# Patient Record
Sex: FEMALE | Race: WHITE | NOT HISPANIC OR LATINO | Employment: FULL TIME | URBAN - METROPOLITAN AREA
[De-identification: names, ages, dates, MRNs, and addresses within clinical notes are randomized per-mention and may not be internally consistent; named-entity substitution may affect disease eponyms.]

---

## 2019-12-10 ENCOUNTER — HOSPITAL ENCOUNTER (EMERGENCY)
Facility: HOSPITAL | Age: 24
Discharge: HOME | End: 2019-12-10
Attending: FAMILY MEDICINE
Payer: COMMERCIAL

## 2019-12-10 VITALS
DIASTOLIC BLOOD PRESSURE: 94 MMHG | SYSTOLIC BLOOD PRESSURE: 150 MMHG | BODY MASS INDEX: 48.66 KG/M2 | WEIGHT: 264.44 LBS | OXYGEN SATURATION: 98 % | TEMPERATURE: 98 F | HEART RATE: 103 BPM | RESPIRATION RATE: 20 BRPM | HEIGHT: 62 IN

## 2019-12-10 DIAGNOSIS — T50.905A ADVERSE EFFECT OF DRUG, INITIAL ENCOUNTER: Primary | ICD-10-CM

## 2019-12-10 PROCEDURE — 99282 EMERGENCY DEPT VISIT SF MDM: CPT

## 2019-12-10 NOTE — ED PROVIDER NOTES
"SCRIBE #1 NOTE: I, Christopher Day, am scribing for, and in the presence of, Jacob Dave Jr., KI. I have scribed the entire note.       History     Chief Complaint   Patient presents with    Headache     Pt c/o of "heaviness and tingling" in head since this a.m. Denies weakness in arms or legs or CP. Recently started new med lamitrogine and has had alterations in thyroid medicine. Left [facility].      Review of patient's allergies indicates:  No Known Allergies      History of Present Illness     HPI    12/10/2019, 3:50 PM  History obtained from the patient      History of Present Illness: Estrella Ruiz is a 23 y.o. female patient who presents to the Emergency Department for evaluation of headache which onset gradually Thursday. Pt describes her headache as tingling and burning to the head, neck, and face. Pt states her sxs are worse after she wakes up and lessens. Symptoms are constant and moderate in severity. No mitigating or exacerbating factors reported. No other sxs reported. Patient denies any loss of balance, fever, chills, neck pain/stiffness, visual disturbance/photophobia, extremity weakness/numbness, dizziness, and all other sxs at this time. Pt states she was diagnosed with Lamictal 2 weeks ago and has been progressively increasing her dose as prescribed. Pt states she stopped taking the Lamictal Saturday and called her psychiatrist Monday who agreed to stop taking it. Pt states she also recently started retaking Synthroid. Pt was seen at [facility] early today and had blood work drawn but left prior to results. No further complaints or concerns at this time.         Arrival mode: Personal vehicle      PCP: Lou Abraham MD        Past Medical History:  No past medical history on file.    Past Surgical History:  No past surgical history on file.      Family History:  No family history on file.    Social History:  Social History     Tobacco Use    Smoking status: Never " Smoker   Substance and Sexual Activity    Alcohol use: Yes     Comment: Ocasionally    Drug use: Not on file    Sexual activity: Not on file        Review of Systems     Review of Systems   Constitutional: Negative for chills and fever.   HENT: Negative for sore throat.    Eyes: Negative for photophobia and visual disturbance.   Respiratory: Negative for shortness of breath.    Cardiovascular: Negative for chest pain.   Gastrointestinal: Negative for nausea.   Genitourinary: Negative for dysuria.   Musculoskeletal: Negative for back pain, neck pain and neck stiffness.   Skin: Negative for rash.   Neurological: Positive for headaches. Negative for dizziness, weakness and numbness.   Hematological: Does not bruise/bleed easily.   All other systems reviewed and are negative.       Physical Exam     Initial Vitals [12/10/19 1535]   BP Pulse Resp Temp SpO2   (!) 150/94 103 20 98.3 °F (36.8 °C) 98 %      MAP       --          Physical Exam  Nursing Notes and Vital Signs Reviewed.  Constitutional: Patient is in no apparent distress. Well-developed and well-nourished.  Head: Atraumatic. Normocephalic.  Eyes: PERRL. EOM intact. Conjunctivae are not pale. No scleral icterus.  ENT: Mucous membranes are moist. Oropharynx is clear and symmetric.    Neck: Supple. Full ROM. No lymphadenopathy.  Cardiovascular: Regular rate. Regular rhythm. No murmurs, rubs, or gallops. Distal pulses are 2+ and symmetric.  Pulmonary/Chest: No respiratory distress. Clear to auscultation bilaterally. No wheezing or rales.  Abdominal: Soft and non-distended.  There is no tenderness.  No rebound, guarding, or rigidity. Good bowel sounds.  Genitourinary: No CVA tenderness  Musculoskeletal: Moves all extremities. No obvious deformities. No edema.   Skin: Warm and dry.  Neurological: Patient is alert and oriented to person, place and time. Pupils ERRL and EOM normal. Cranial nerves II-XII are intact. Strength is full bilaterally; it is equal and 5/5 in  "bilateral upper and lower extremities. There is no pronator drift of outstretched arms. Light touch sense is intact. Speech is clear and normal. No acute focal neurological deficits noted.  Psychiatric: Normal affect. Good eye contact. Appropriate in content.     ED Course   Procedures  ED Vital Signs:  Vitals:    12/10/19 1535   BP: (!) 150/94   Pulse: 103   Resp: 20   Temp: 98.3 °F (36.8 °C)   TempSrc: Oral   SpO2: 98%   Weight: 120 kg (264 lb 7.1 oz)   Height: 5' 2" (1.575 m)         All Lab Results:  CBC auto differential (12/10/2019 11:52 AM CST)  CBC auto differential (12/10/2019 11:52 AM CST)   Component Value Ref Range Performed At Pathologist Signature   WHITE BLOOD CELL COUNT 12.5 (H) 4.0 - 11.0 1000/ul [facility]     RED BLOOD CELL COUNT 5.17 3.80 - 5.30 mill/uL [facility]     HEMOGLOBIN 13.9 12.0 - 16.0 gm/dl [facility]     HEMATOCRIT 43.4 37.0 - 47.0 % [facility]     MEAN CORPUSCULAR VOLUME 84 80 - 100 fl [facility]     MEAN CORPUSCULAR HEMOGLOBIN CONC 31.9 31.0 - 37.0 gm/dl [facility]     RED CELL DISTRIBUTION WIDTH 12.6 12.1 - 14.9 % [facility]     PLATELET COUNT 350 150 - 375 1000/ul [facility]     MEAN PLATELET VOLUME 7.6 6.5 - 12.0 fl [facility]     Neutrophils Abs 8.8 1.5 - 10.0 1000/ul [facility]     Lymphocytes Abs 2.5 1.3 - 2.9 1000/ul [facility]     Monocytes Abs 0.9 0.1 - 1.0 1000/ul [facility]     Eosinophils Abs 0.2 0.0 - 0.7 1000/ul [facility]     Basophils Abs 0.1 0.0 - 0.1 1000/ul [facility]     Neutrophils % 71 44 - 81 % [facility]     Lymphocytes % 20 (L) 21 - 47 % [facility]     Monocytes % 7 2 - 11 % [facility]     Eosinophils % 1 0 - 7 % [facility]     Basophils % 1 0 - 1 % [facility]       CBC auto differential " (12/10/2019 11:52 AM CST)   Specimen   Blood - Vein     CBC auto differential (12/10/2019 11:52 AM CST)   Performing Organization Address City[facility]   [facility]   [place] [address] [phone]     Back to top of Lab Results       Magnesium (12/10/2019 11:52 AM CST)  Magnesium (12/10/2019 11:52 AM CST)   Component Value Ref Range Performed At Pathologist Signature   Magnesium Level 2.1 1.6 - 2.6 mg/dL [facility]       Magnesium (12/10/2019 11:52 AM CST)   Specimen   Blood - Vein     Magnesium (12/10/2019 11:52 AM CST)   Performing Organization Address City/[facility] Phone Number   [facility]   [place] [address] [phone]     Back to top of Lab Results       TSH (12/10/2019 11:52 AM CST)  TSH (12/10/2019 11:52 AM CST)   Component Value Ref Range Performed At Pathologist Signature   TSH Ultrasensitive 4.250 0.350 - 4.940 uIU/mL [facility]       TSH (12/10/2019 11:52 AM CST)   Specimen   Blood - Vein     TSH (12/10/2019 11:52 AM CST)   Performing Organization Address City/[facility] Phone Number   [facility]   [place] [address] [phone]     Back to top of Lab Results       Comprehensive metabolic panel (12/10/2019 11:52 AM CST)  Comprehensive metabolic panel (12/10/2019 11:52 AM CST)   Component Value Ref Range Performed At Pathologist Signature   Sodium Level 137 136 - 145 mmol/L [facility]     Potassium Level 4.6 3.5 - 5.1 mmol/L [facility]     Chloride Level 105 100 - 109 mmol/L [facility]     CO2 Level 22 22 - 33 mmol/L [facility]     Glucose Level 96 70 - 100 mg/dL [facility]     Blood Urea Nitrogen Level 12 5 - 25 mg/dL [facility]     Creatinine Level 0.81 0.57 - 1.25 mg/dL [facility]     GFR- >=60 mL/min/1.73mSq [facility]     GFR-RJ 87 >=60 mL/min/1.73mSq [facility]     Calcium Level 9.9 8.8 - 10.6 mg/dL [facility]     Protein Total 8.5 (H) 6.0 - 8.3 g/dL [facility]     Albumin Level 4.1 3.5 - 5.0 g/dL [facility]     Bilirubin Total 0.6 0.2 - 1.2 mg/dL [facility]     Alkaline Phosphatase Level 74 40 - 150 IU/L [facility]     SGOT (AST) 23 10 - 58 IU/L [facility]     SGPT (ALT) 47 5 - 50 IU/L [facility]     Anion Gap 10 8 - 16 mmol/L [facility]                         The Emergency Provider reviewed the vital signs and test results, which are outlined above.     ED Discussion     4:02 PM: Discussed with pt all pertinent ED information and results from [facility]. Discussed pt dx and plan of tx. Gave pt all f/u and return to the ED instructions. All questions and concerns were addressed at this time. Pt expresses understanding of information and instructions, and is comfortable with plan to discharge. Pt is stable for discharge.    I discussed with patient and/or family/caretaker that evaluation in the ED does not suggest any emergent or life threatening medical conditions requiring immediate intervention beyond what was provided in the ED, and I believe patient is safe for discharge.  Regardless, an unremarkable evaluation in the ED does not preclude the development or presence of a serious of life threatening condition. As such, patient was instructed to return immediately for any worsening or change in current symptoms.         Medical Decision Making:   Clinical Tests:   Lab Tests: Reviewed           ED Medication(s):  Medications - No data to display    There are no discharge medications for this patient.      Follow-up Information     Lou Abraham MD In 1 week.    Specialty:  Family Medicine  Contact information:  [address] [facility] [address] [phone]                       Scribdarshana  Attestation:   Scribe #1: I performed the above scribed service and the documentation accurately describes the services I performed. I attest to the accuracy of the note.     Attending:   Physician Attestation Statement for Scribe #1: I, KI Allison Jr., personally performed the services described in this documentation, as scribed by Christopher Boyle, in my presence, and it is both accurate and complete.           Clinical Impression       ICD-10-CM ICD-9-CM   1. Adverse effect of drug, initial encounter T50.905A E947.9       Disposition:   Disposition: Discharged  Condition: Stable         KI Allison Jr.  12/10/19 1935

## 2019-12-13 ENCOUNTER — HOSPITAL ENCOUNTER (EMERGENCY)
Facility: HOSPITAL | Age: 24
Discharge: HOME | End: 2019-12-13
Attending: EMERGENCY MEDICINE
Payer: COMMERCIAL

## 2019-12-13 VITALS
BODY MASS INDEX: 47.87 KG/M2 | OXYGEN SATURATION: 98 % | TEMPERATURE: 98 F | WEIGHT: 260.13 LBS | RESPIRATION RATE: 20 BRPM | HEART RATE: 105 BPM | SYSTOLIC BLOOD PRESSURE: 168 MMHG | HEIGHT: 62 IN | DIASTOLIC BLOOD PRESSURE: 81 MMHG

## 2019-12-13 DIAGNOSIS — R42 LIGHTHEADED: ICD-10-CM

## 2019-12-13 DIAGNOSIS — R42 DIZZINESS: Primary | ICD-10-CM

## 2019-12-13 LAB
ALBUMIN SERPL BCP-MCNC: 4.3 G/DL (ref 3.5–5.2)
ALP SERPL-CCNC: 69 U/L (ref 55–135)
ALT SERPL W/O P-5'-P-CCNC: 54 U/L (ref 10–44)
ANION GAP SERPL CALC-SCNC: 8 MMOL/L (ref 8–16)
AST SERPL-CCNC: 23 U/L (ref 10–40)
B-HCG UR QL: NEGATIVE
BASOPHILS # BLD AUTO: 0.09 K/UL (ref 0–0.2)
BASOPHILS NFR BLD: 0.8 % (ref 0–1.9)
BILIRUB SERPL-MCNC: 0.9 MG/DL (ref 0.1–1)
BUN SERPL-MCNC: 12 MG/DL (ref 6–20)
CALCIUM SERPL-MCNC: 10.1 MG/DL (ref 8.7–10.5)
CHLORIDE SERPL-SCNC: 106 MMOL/L (ref 95–110)
CO2 SERPL-SCNC: 25 MMOL/L (ref 23–29)
CREAT SERPL-MCNC: 1 MG/DL (ref 0.5–1.4)
DIFFERENTIAL METHOD: ABNORMAL
EOSINOPHIL # BLD AUTO: 0.1 K/UL (ref 0–0.5)
EOSINOPHIL NFR BLD: 0.9 % (ref 0–8)
ERYTHROCYTE [DISTWIDTH] IN BLOOD BY AUTOMATED COUNT: 13.5 % (ref 11.5–14.5)
EST. GFR  (AFRICAN AMERICAN): >60 ML/MIN/1.73 M^2
EST. GFR  (NON AFRICAN AMERICAN): >60 ML/MIN/1.73 M^2
GLUCOSE SERPL-MCNC: 97 MG/DL (ref 70–110)
HCT VFR BLD AUTO: 42.4 % (ref 37–48.5)
HGB BLD-MCNC: 13.8 G/DL (ref 12–16)
HIV 1+2 AB+HIV1 P24 AG SERPL QL IA: NEGATIVE
IMM GRANULOCYTES # BLD AUTO: 0.03 K/UL (ref 0–0.04)
IMM GRANULOCYTES NFR BLD AUTO: 0.3 % (ref 0–0.5)
LYMPHOCYTES # BLD AUTO: 2.4 K/UL (ref 1–4.8)
LYMPHOCYTES NFR BLD: 22.2 % (ref 18–48)
MCH RBC QN AUTO: 27.9 PG (ref 27–31)
MCHC RBC AUTO-ENTMCNC: 32.5 G/DL (ref 32–36)
MCV RBC AUTO: 86 FL (ref 82–98)
MONOCYTES # BLD AUTO: 1 K/UL (ref 0.3–1)
MONOCYTES NFR BLD: 8.9 % (ref 4–15)
NEUTROPHILS # BLD AUTO: 7.2 K/UL (ref 1.8–7.7)
NEUTROPHILS NFR BLD: 66.9 % (ref 38–73)
NRBC BLD-RTO: 0 /100 WBC
PLATELET # BLD AUTO: 382 K/UL (ref 150–350)
PMV BLD AUTO: 9.9 FL (ref 9.2–12.9)
POTASSIUM SERPL-SCNC: 4.2 MMOL/L (ref 3.5–5.1)
PROT SERPL-MCNC: 7.9 G/DL (ref 6–8.4)
RBC # BLD AUTO: 4.94 M/UL (ref 4–5.4)
SODIUM SERPL-SCNC: 139 MMOL/L (ref 136–145)
TSH SERPL DL<=0.005 MIU/L-ACNC: 3.64 UIU/ML (ref 0.4–4)
WBC # BLD AUTO: 10.79 K/UL (ref 3.9–12.7)

## 2019-12-13 PROCEDURE — 36415 COLL VENOUS BLD VENIPUNCTURE: CPT

## 2019-12-13 PROCEDURE — 99284 EMERGENCY DEPT VISIT MOD MDM: CPT | Mod: 25

## 2019-12-13 PROCEDURE — 85025 COMPLETE CBC W/AUTO DIFF WBC: CPT

## 2019-12-13 PROCEDURE — 80053 COMPREHEN METABOLIC PANEL: CPT

## 2019-12-13 PROCEDURE — 81025 URINE PREGNANCY TEST: CPT

## 2019-12-13 PROCEDURE — 86703 HIV-1/HIV-2 1 RESULT ANTBDY: CPT

## 2019-12-13 PROCEDURE — 84443 ASSAY THYROID STIM HORMONE: CPT

## 2019-12-13 NOTE — ED NOTES
Patient examined, evaluated, and educated on discharge prescriptions and instructions by NP. Patient discharged to lobby by NP before DC VS could be obtained by this RN. Pt. Witnessed exiting ED with even, steady gait; accompanied by family member; in NAD; resp. E/u.

## 2019-12-13 NOTE — ED PROVIDER NOTES
"Encounter Date: 12/13/2019       History     Chief Complaint   Patient presents with    Hypertension     pt states, "my head is all tingly and my BP is high," x1 week (160s/100s)     23 year old female with complaint of mild headache and feeling light headed X one week.  Pt reports elevated blood pressure over the past week.  No fever or chills. No weakness. Pt also reports difficulty focusing over the past week.  Denies passing out.  Pt has history of hypothyroidism and recently stopped taking her synthroid.          Review of patient's allergies indicates:  No Known Allergies  History reviewed. No pertinent past medical history.  History reviewed. No pertinent surgical history.  History reviewed. No pertinent family history.  Social History     Tobacco Use    Smoking status: Never Smoker   Substance Use Topics    Alcohol use: Yes     Comment: Ocasionally    Drug use: Not on file     Review of Systems   Constitutional: Negative for fever.   HENT: Negative for sore throat.    Respiratory: Negative for shortness of breath.    Cardiovascular: Negative for chest pain.   Gastrointestinal: Negative for nausea.   Genitourinary: Negative for dysuria.   Musculoskeletal: Negative for back pain.   Skin: Negative for rash.   Neurological: Positive for light-headedness. Negative for weakness.   Hematological: Does not bruise/bleed easily.       Physical Exam     Initial Vitals [12/13/19 1301]   BP Pulse Resp Temp SpO2   (!) 168/81 105 20 97.6 °F (36.4 °C) 98 %      MAP       --         Physical Exam    Nursing note and vitals reviewed.  Constitutional: She appears well-developed and well-nourished.   HENT:   Head: Normocephalic and atraumatic.   Eyes: Conjunctivae and EOM are normal. Pupils are equal, round, and reactive to light.   Neck: Normal range of motion. Neck supple.   Cardiovascular: Normal rate, regular rhythm, normal heart sounds and intact distal pulses.   Pulmonary/Chest: Breath sounds normal.   Abdominal: " Soft. There is no tenderness. There is no rebound and no guarding.   Musculoskeletal: Normal range of motion.   Neurological: She is alert and oriented to person, place, and time. She has normal strength and normal reflexes.   Skin: Skin is warm and dry.   Psychiatric: She has a normal mood and affect. Her behavior is normal. Thought content normal.         ED Course   Procedures  Labs Reviewed   CBC W/ AUTO DIFFERENTIAL - Abnormal; Notable for the following components:       Result Value    Platelets 382 (*)     All other components within normal limits   COMPREHENSIVE METABOLIC PANEL - Abnormal; Notable for the following components:    ALT 54 (*)     All other components within normal limits   HIV 1 / 2 ANTIBODY   TSH   PREGNANCY TEST, URINE RAPID          Imaging Results          CT Head Without Contrast (Final result)  Result time 12/13/19 14:55:14    Final result by Dani Decker MD (12/13/19 14:55:14)                 Impression:      No acute findings.      Electronically signed by: Dani Decker MD  Date:    12/13/2019  Time:    14:55             Narrative:    EXAMINATION:  CT HEAD WITHOUT CONTRAST    CLINICAL HISTORY:  Acute headache.  Hypertension.  Lightheadedness.    TECHNIQUE:  Standard noncontrast CT of the brain.    All CT scans at this facility are performed  using dose modulation techniques as appropriate to performed exam including the following:  automated exposure control; adjustment of mA and/or kV according to the patients size (this includes techniques or standardized protocols for targeted exams where dose is matched to indication/reason for exam: i.e. extremities or head);  iterative reconstruction technique.    COMPARISON:  None.    FINDINGS:  The ventricles are nonenlarged.  No acute hemorrhage, edema or mass effect is identified.    The skull is grossly normal.                                                 Labs Reviewed   CBC W/ AUTO DIFFERENTIAL - Abnormal; Notable for the  following components:       Result Value    Platelets 382 (*)     All other components within normal limits   COMPREHENSIVE METABOLIC PANEL - Abnormal; Notable for the following components:    ALT 54 (*)     All other components within normal limits   HIV 1 / 2 ANTIBODY   TSH   PREGNANCY TEST, URINE RAPID           Imaging Results          CT Head Without Contrast (Final result)  Result time 12/13/19 14:55:14    Final result by Dani Decker MD (12/13/19 14:55:14)                 Impression:      No acute findings.      Electronically signed by: Dani Decker MD  Date:    12/13/2019  Time:    14:55             Narrative:    EXAMINATION:  CT HEAD WITHOUT CONTRAST    CLINICAL HISTORY:  Acute headache.  Hypertension.  Lightheadedness.    TECHNIQUE:  Standard noncontrast CT of the brain.    All CT scans at this facility are performed  using dose modulation techniques as appropriate to performed exam including the following:  automated exposure control; adjustment of mA and/or kV according to the patients size (this includes techniques or standardized protocols for targeted exams where dose is matched to indication/reason for exam: i.e. extremities or head);  iterative reconstruction technique.    COMPARISON:  None.    FINDINGS:  The ventricles are nonenlarged.  No acute hemorrhage, edema or mass effect is identified.    The skull is grossly normal.                                              Clinical Impression:       ICD-10-CM ICD-9-CM   1. Dizziness R42 780.4   2. Lightheaded R42 780.4                             Lester Lorenzo NP  12/13/19 150

## 2019-12-13 NOTE — ED NOTES
"Patient complains of light headedness and elevated BP x 1 week. Pt reports trouble focusing and feels "fuzzy."  Level of Consciousness: The patient is awake, alert, and oriented with appropriate affect and speech; oriented to person, place and time.  Appearance: Sitting up in treatment area with no acute distress noted. Clothing and hygiene are clean and worn appropriately.  Skin: Skin is intact; color consistent with ethnicity.    Musculoskeletal: Moves all extremities well in full range of motion. No obvious deformities or swelling noted.  Respiratory: Airway open and patent, respirations spontaneous, even and unlabored. No accessory muscles in use.   Cardiac: Regular rate, no peripheral edema noted..  Abdomen:  No distention noted.  Neurologic: PERRLA, face exhibits symmetrical expression, reports normal sensation to all extremities and face.    Patient verbalized understanding of status and plan of care.  "

## 2019-12-13 NOTE — ED NOTES
Report received and pt. Care assumed from ANAMARIA Junior. Pt. Just returning from radiology via WC. Pt. Is AAOx4. NAD, resp. E/u. Skin WDI and appropriate skin tone. Denies needs. WCTM.

## 2019-12-19 ENCOUNTER — HOSPITAL ENCOUNTER (EMERGENCY)
Facility: HOSPITAL | Age: 24
Discharge: HOME | End: 2019-12-19
Attending: EMERGENCY MEDICINE
Payer: COMMERCIAL

## 2019-12-19 VITALS
SYSTOLIC BLOOD PRESSURE: 160 MMHG | HEIGHT: 62 IN | BODY MASS INDEX: 48.56 KG/M2 | OXYGEN SATURATION: 100 % | TEMPERATURE: 98 F | DIASTOLIC BLOOD PRESSURE: 84 MMHG | RESPIRATION RATE: 18 BRPM | WEIGHT: 263.88 LBS | HEART RATE: 88 BPM

## 2019-12-19 DIAGNOSIS — I10 HYPERTENSION, UNSPECIFIED TYPE: Primary | ICD-10-CM

## 2019-12-19 PROCEDURE — 99281 EMR DPT VST MAYX REQ PHY/QHP: CPT

## 2019-12-23 NOTE — ED PROVIDER NOTES
Encounter Date: 12/19/2019       History     Chief Complaint   Patient presents with    Hypertension     pt reports she was at her therapy session and had elevated BP. Pt is also having ringing in the ears, headache, dizziness since this am    Headache     The history is provided by the patient.   Hypertension    This is a new problem. The current episode started today. The problem has been rapidly improving. Pertinent negatives include no chest pain, no orthopnea, no palpitations, no PND, no anxiety, no confusion, no malaise/fatigue, no blurred vision, no headaches, no tinnitus, no neck pain, no peripheral edema, no dizziness and no shortness of breath. There are no associated agents to hypertension. There are no known risk factors.     Review of patient's allergies indicates:  No Known Allergies  No past medical history on file.  No past surgical history on file.  No family history on file.  Social History     Tobacco Use    Smoking status: Never Smoker   Substance Use Topics    Alcohol use: Yes     Comment: Ocasionally    Drug use: Not on file     Review of Systems   Constitutional: Negative for chills, fever and malaise/fatigue.   HENT: Negative for sore throat and tinnitus.    Eyes: Negative for blurred vision, photophobia and redness.   Respiratory: Negative for cough and shortness of breath.    Cardiovascular: Negative for chest pain, palpitations, orthopnea and PND.   Gastrointestinal: Negative for abdominal pain, diarrhea and nausea.   Endocrine: Negative for polydipsia and polyphagia.   Genitourinary: Negative for dysuria.   Musculoskeletal: Negative for arthralgias, back pain, myalgias and neck pain.   Skin: Negative for rash.   Neurological: Negative for dizziness, weakness and headaches.   Hematological: Does not bruise/bleed easily.   Psychiatric/Behavioral: Negative for confusion. The patient is not nervous/anxious.    All other systems reviewed and are negative.      Physical Exam     Initial  Vitals [12/19/19 1325]   BP Pulse Resp Temp SpO2   (!) 160/84 88 18 98.4 °F (36.9 °C) 100 %      MAP       --         Physical Exam    Nursing note and vitals reviewed.  Constitutional: Vital signs are normal. She appears well-developed and well-nourished. No distress.   HENT:   Head: Normocephalic and atraumatic.   Right Ear: External ear normal.   Left Ear: External ear normal.   Nose: Nose normal.   Mouth/Throat: Oropharynx is clear and moist.   Eyes: Conjunctivae, EOM and lids are normal. Pupils are equal, round, and reactive to light.   Neck: Normal range of motion and full passive range of motion without pain. Neck supple.   Cardiovascular: Normal rate, regular rhythm, S1 normal, S2 normal, normal heart sounds, intact distal pulses and normal pulses.   Pulmonary/Chest: Breath sounds normal. No respiratory distress. She has no wheezes. She has no rales.   Abdominal: Soft. Normal appearance and bowel sounds are normal. She exhibits no distension. There is no tenderness.   Musculoskeletal: Normal range of motion.   Lymphadenopathy:     She has no cervical adenopathy.   Neurological: She is alert and oriented to person, place, and time. She has normal strength. No cranial nerve deficit or sensory deficit. Coordination and gait normal.   Skin: Skin is warm, dry and intact.   Psychiatric: She has a normal mood and affect. Her speech is normal and behavior is normal. Judgment and thought content normal. Cognition and memory are normal.         ED Course   Procedures  Labs Reviewed - No data to display       Imaging Results    None                                          Clinical Impression:       ICD-10-CM ICD-9-CM   1. Hypertension, unspecified type I10 401.9         Disposition:   Disposition: Discharged  Condition: Stable                     OLAF Lizama  12/23/19 0812       OALF Lizama  12/23/19 0813

## 2022-10-14 ENCOUNTER — HOSPITAL ENCOUNTER (EMERGENCY)
Facility: HOSPITAL | Age: 27
Discharge: HOME | End: 2022-10-14
Attending: EMERGENCY MEDICINE
Payer: MEDICAID

## 2022-10-14 VITALS
TEMPERATURE: 99 F | HEART RATE: 80 BPM | RESPIRATION RATE: 16 BRPM | OXYGEN SATURATION: 97 % | SYSTOLIC BLOOD PRESSURE: 145 MMHG | WEIGHT: 275.56 LBS | DIASTOLIC BLOOD PRESSURE: 70 MMHG | HEIGHT: 62 IN | BODY MASS INDEX: 50.71 KG/M2

## 2022-10-14 DIAGNOSIS — T14.8XXA ABRASION: ICD-10-CM
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
## 2022-10-14 DIAGNOSIS — R07.81 RIB PAIN ON LEFT SIDE: ICD-10-CM

## 2022-10-14 DIAGNOSIS — M54.50 LOW BACK PAIN, UNSPECIFIED BACK PAIN LATERALITY, UNSPECIFIED CHRONICITY, UNSPECIFIED WHETHER SCIATICA PRESENT: ICD-10-CM

## 2022-10-14 LAB
ALBUMIN SERPL BCP-MCNC: 4.3 G/DL (ref 3.5–5.2)
ALP SERPL-CCNC: 67 U/L (ref 55–135)
ALT SERPL W/O P-5'-P-CCNC: 21 U/L (ref 10–44)
ANION GAP SERPL CALC-SCNC: 11 MMOL/L (ref 8–16)
AST SERPL-CCNC: 17 U/L (ref 10–40)
B-HCG UR QL: NEGATIVE
BASOPHILS # BLD AUTO: 0.08 K/UL (ref 0–0.2)
BASOPHILS NFR BLD: 0.6 % (ref 0–1.9)
BILIRUB SERPL-MCNC: 1.2 MG/DL (ref 0.1–1)
BUN SERPL-MCNC: 14 MG/DL (ref 6–20)
CALCIUM SERPL-MCNC: 9.4 MG/DL (ref 8.7–10.5)
CHLORIDE SERPL-SCNC: 107 MMOL/L (ref 95–110)
CO2 SERPL-SCNC: 21 MMOL/L (ref 23–29)
CREAT SERPL-MCNC: 1 MG/DL (ref 0.5–1.4)
DIFFERENTIAL METHOD: ABNORMAL
EOSINOPHIL # BLD AUTO: 0.1 K/UL (ref 0–0.5)
EOSINOPHIL NFR BLD: 0.8 % (ref 0–8)
ERYTHROCYTE [DISTWIDTH] IN BLOOD BY AUTOMATED COUNT: 13.5 % (ref 11.5–14.5)
EST. GFR  (NO RACE VARIABLE): >60 ML/MIN/1.73 M^2
GLUCOSE SERPL-MCNC: 91 MG/DL (ref 70–110)
HCT VFR BLD AUTO: 39.7 % (ref 37–48.5)
HCV AB SERPL QL IA: NEGATIVE
HEP C VIRUS HOLD SPECIMEN: NORMAL
HGB BLD-MCNC: 13.3 G/DL (ref 12–16)
HIV 1+2 AB+HIV1 P24 AG SERPL QL IA: NEGATIVE
IMM GRANULOCYTES # BLD AUTO: 0.03 K/UL (ref 0–0.04)
IMM GRANULOCYTES NFR BLD AUTO: 0.2 % (ref 0–0.5)
LYMPHOCYTES # BLD AUTO: 2.7 K/UL (ref 1–4.8)
LYMPHOCYTES NFR BLD: 20.3 % (ref 18–48)
MCH RBC QN AUTO: 28.2 PG (ref 27–31)
MCHC RBC AUTO-ENTMCNC: 33.5 G/DL (ref 32–36)
MCV RBC AUTO: 84 FL (ref 82–98)
MONOCYTES # BLD AUTO: 1.1 K/UL (ref 0.3–1)
MONOCYTES NFR BLD: 8.4 % (ref 4–15)
NEUTROPHILS # BLD AUTO: 9.3 K/UL (ref 1.8–7.7)
NEUTROPHILS NFR BLD: 69.7 % (ref 38–73)
NRBC BLD-RTO: 0 /100 WBC
PLATELET # BLD AUTO: 319 K/UL (ref 150–450)
PMV BLD AUTO: 9.8 FL (ref 9.2–12.9)
POTASSIUM SERPL-SCNC: 3.8 MMOL/L (ref 3.5–5.1)
PROT SERPL-MCNC: 7.6 G/DL (ref 6–8.4)
RBC # BLD AUTO: 4.71 M/UL (ref 4–5.4)
SODIUM SERPL-SCNC: 139 MMOL/L (ref 136–145)
WBC # BLD AUTO: 13.3 K/UL (ref 3.9–12.7)

## 2022-10-14 PROCEDURE — 87389 HIV-1 AG W/HIV-1&-2 AB AG IA: CPT | Performed by: EMERGENCY MEDICINE

## 2022-10-14 PROCEDURE — 25500020 PHARM REV CODE 255: Performed by: NURSE PRACTITIONER

## 2022-10-14 PROCEDURE — 99285 EMERGENCY DEPT VISIT HI MDM: CPT | Mod: 25

## 2022-10-14 PROCEDURE — 80053 COMPREHEN METABOLIC PANEL: CPT | Performed by: NURSE PRACTITIONER

## 2022-10-14 PROCEDURE — 25000003 PHARM REV CODE 250: Performed by: NURSE PRACTITIONER

## 2022-10-14 PROCEDURE — 86803 HEPATITIS C AB TEST: CPT | Performed by: EMERGENCY MEDICINE

## 2022-10-14 PROCEDURE — 85025 COMPLETE CBC W/AUTO DIFF WBC: CPT | Performed by: NURSE PRACTITIONER

## 2022-10-14 PROCEDURE — 81025 URINE PREGNANCY TEST: CPT | Performed by: NURSE PRACTITIONER

## 2022-10-14 RX ORDER — LOSARTAN POTASSIUM 50 MG/1
50 TABLET ORAL DAILY
COMMUNITY
Start: 2022-08-22 | End: 2023-10-14

## 2022-10-14 RX ORDER — ATORVASTATIN CALCIUM 20 MG/1
20 TABLET, FILM COATED ORAL NIGHTLY
COMMUNITY
Start: 2022-09-10

## 2022-10-14 RX ORDER — CETIRIZINE HYDROCHLORIDE 10 MG/1
10 TABLET ORAL DAILY
COMMUNITY
Start: 2022-09-10

## 2022-10-14 RX ORDER — LEVOTHYROXINE SODIUM 88 UG/1
88 TABLET ORAL EVERY MORNING
COMMUNITY
Start: 2022-07-17

## 2022-10-14 RX ORDER — FLUTICASONE PROPIONATE 50 MCG
2 SPRAY, SUSPENSION (ML) NASAL DAILY
COMMUNITY
Start: 2022-09-11

## 2022-10-14 RX ORDER — CYCLOBENZAPRINE HCL 10 MG
10 TABLET ORAL 3 TIMES DAILY PRN
Qty: 15 TABLET | Refills: 0 | Status: SHIPPED | OUTPATIENT
Start: 2022-10-14 | End: 2022-10-19

## 2022-10-14 RX ORDER — NAPROXEN 500 MG/1
500 TABLET ORAL 2 TIMES DAILY WITH MEALS
Qty: 10 TABLET | Refills: 0 | Status: SHIPPED | OUTPATIENT
Start: 2022-10-14 | End: 2022-10-19

## 2022-10-14 RX ADMIN — BACITRACIN ZINC, NEOMYCIN SULFATE, AND POLYMYXIN B SULFATE: 400; 3.5; 5 OINTMENT TOPICAL at 09:10

## 2022-10-14 RX ADMIN — IOHEXOL 100 ML: 350 INJECTION, SOLUTION INTRAVENOUS at 11:10
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
## 2023-10-14 ENCOUNTER — OFFICE VISIT (OUTPATIENT)
Dept: URGENT CARE | Facility: CLINIC | Age: 28
End: 2023-10-14
Payer: MEDICAID

## 2023-10-14 VITALS
WEIGHT: 230 LBS | DIASTOLIC BLOOD PRESSURE: 94 MMHG | SYSTOLIC BLOOD PRESSURE: 140 MMHG | BODY MASS INDEX: 42.33 KG/M2 | OXYGEN SATURATION: 99 % | TEMPERATURE: 98 F | HEART RATE: 100 BPM | RESPIRATION RATE: 18 BRPM | HEIGHT: 62 IN

## 2023-10-14 DIAGNOSIS — N61.0 CELLULITIS OF LEFT BREAST: Primary | ICD-10-CM

## 2023-10-14 PROCEDURE — 99204 PR OFFICE/OUTPT VISIT, NEW, LEVL IV, 45-59 MIN: ICD-10-PCS | Mod: S$GLB,,, | Performed by: NURSE PRACTITIONER

## 2023-10-14 PROCEDURE — 99204 OFFICE O/P NEW MOD 45 MIN: CPT | Mod: S$GLB,,, | Performed by: NURSE PRACTITIONER

## 2023-10-14 RX ORDER — LOSARTAN POTASSIUM AND HYDROCHLOROTHIAZIDE 12.5; 1 MG/1; MG/1
1 TABLET ORAL
COMMUNITY
Start: 2023-07-19

## 2023-10-14 RX ORDER — ERGOCALCIFEROL 1.25 MG/1
50000 CAPSULE ORAL
COMMUNITY
Start: 2023-10-11

## 2023-10-14 RX ORDER — MUPIROCIN 20 MG/G
OINTMENT TOPICAL 3 TIMES DAILY
Qty: 30 G | Refills: 1 | Status: SHIPPED | OUTPATIENT
Start: 2023-10-14 | End: 2023-10-21

## 2023-10-14 RX ORDER — DOXYCYCLINE 100 MG/1
100 CAPSULE ORAL 2 TIMES DAILY
Qty: 14 CAPSULE | Refills: 0 | Status: SHIPPED | OUTPATIENT
Start: 2023-10-14 | End: 2023-10-21

## 2023-10-14 NOTE — PROGRESS NOTES
"Subjective:      Patient ID: Estrella Ruiz is a 27 y.o. female.    Vitals:  height is 5' 2" (1.575 m) and weight is 104.3 kg (230 lb). Her oral temperature is 98.2 °F (36.8 °C). Her blood pressure is 140/94 (abnormal) and her pulse is 100. Her respiration is 18 and oxygen saturation is 99%.     Chief Complaint: Abscess    Abscess  Chronicity:  NewProgression Since Onset: gradually worsening  Abscess location: left breast.  Associated Symptoms: no fever, no chills, no sweats  Characteristics: painful, redness and swelling    Characteristics: not draining    Characteristics comment: little itch  Pain Scale:  5/10  Treatments Tried:  Warm compresses and topical antibiotics  Relieved by:  Nothing      Constitution: Negative for chills and fever.   Skin:  Positive for skin thickening/induration, erythema and abscess.      Objective:     Vitals:    10/14/23 1351   BP: (!) 140/94   BP Location: Left arm   Patient Position: Sitting   BP Method: Medium (Automatic)   Pulse: 100   Resp: 18   Temp: 98.2 °F (36.8 °C)   TempSrc: Oral   SpO2: 99%   Weight: 104.3 kg (230 lb)   Height: 5' 2" (1.575 m)       Physical Exam   Constitutional: She is oriented to person, place, and time. She appears well-developed.   HENT:   Head: Normocephalic and atraumatic. Head is without abrasion, without contusion and without laceration.   Ears:   Right Ear: External ear normal.   Left Ear: External ear normal.   Nose: Nose normal.   Mouth/Throat: Oropharynx is clear and moist and mucous membranes are normal. Mucous membranes are moist.   Eyes: Conjunctivae, EOM and lids are normal. Pupils are equal, round, and reactive to light.   Neck: Trachea normal and phonation normal. Neck supple.   Cardiovascular: Normal rate, regular rhythm and normal heart sounds.   Pulmonary/Chest: Effort normal and breath sounds normal. No stridor. No respiratory distress. Left breast exhibits skin change and tenderness.   Musculoskeletal: Normal range of motion.   "       General: Normal range of motion.   Neurological: She is alert and oriented to person, place, and time.   Skin: Skin is warm, dry, intact and no rash. Capillary refill takes less than 2 seconds. erythema No abrasion, No burn, No bruising and No ecchymosis         Comments: Left breast  7-8 cm area of erythema and orange de peau appearance; no fluctuance or discharge; Neg axillary lymphadenopathy;   Psychiatric: Her speech is normal and behavior is normal. Judgment and thought content normal.   Nursing note and vitals reviewed.      Assessment:     1. Cellulitis of left breast        Plan:   Patient stable for discharge and home management of condition    Patient educated re: general concern for breast skin changes in women  <40 yr; educ re: ddx to  r/o breast disease/cancer;  Patient became overwhelmed with anxiety and allowed to rest prior to departure from office.    To reduce follow up obstacles diagnostic MMG with US ordered;     Cellulitis of left breast  -     doxycycline (VIBRAMYCIN) 100 MG Cap; Take 1 capsule (100 mg total) by mouth 2 (two) times daily. Take with food for 7 days  Dispense: 14 capsule; Refill: 0  -     Mammo Digital Diagnostic Left with Ricci; Future; Expected date: 10/14/2023  -     US Breast Left Limited; Future; Expected date: 10/21/2023    Other orders  -     mupirocin (BACTROBAN) 2 % ointment; Apply topically 3 (three) times daily. for 7 days  Dispense: 30 g; Refill: 1        Patient Instructions   Avoid scratching rash area     Complete antibiotic as prescribed.     Follow up in 2-3 days    Monitor area closely and you must follow up with PCP and or GYN         Additional MDM:   Differential Diagnosis:   Other: The following diagnoses were also considered and will be evaluated: cellulitis, inflammatory breast disease and chest acne. Initial clinical exam suggestive of cellulitis; due to area involved must r/o breast disease;

## 2023-10-14 NOTE — PATIENT INSTRUCTIONS
Avoid scratching rash area     Complete antibiotic as prescribed.     Follow up in 2-3 days    Monitor area closely and you must follow up with PCP and or GYN